# Patient Record
Sex: MALE | Race: BLACK OR AFRICAN AMERICAN
[De-identification: names, ages, dates, MRNs, and addresses within clinical notes are randomized per-mention and may not be internally consistent; named-entity substitution may affect disease eponyms.]

---

## 2020-05-21 ENCOUNTER — HOSPITAL ENCOUNTER (EMERGENCY)
Dept: HOSPITAL 92 - ERS | Age: 50
Discharge: HOME | End: 2020-05-21
Payer: SELF-PAY

## 2020-05-21 DIAGNOSIS — F17.210: ICD-10-CM

## 2020-05-21 DIAGNOSIS — I10: Primary | ICD-10-CM

## 2020-05-21 DIAGNOSIS — E11.9: ICD-10-CM

## 2020-05-21 DIAGNOSIS — Z79.84: ICD-10-CM

## 2020-05-21 DIAGNOSIS — Z79.899: ICD-10-CM

## 2020-05-21 LAB
ALBUMIN SERPL BCG-MCNC: 4.2 G/DL (ref 3.5–5)
ALP SERPL-CCNC: 87 U/L (ref 40–110)
ALT SERPL W P-5'-P-CCNC: 30 U/L (ref 8–55)
ANION GAP SERPL CALC-SCNC: 16 MMOL/L (ref 10–20)
AST SERPL-CCNC: 15 U/L (ref 5–34)
BASOPHILS # BLD AUTO: 0 THOU/UL (ref 0–0.2)
BASOPHILS NFR BLD AUTO: 0.5 % (ref 0–1)
BILIRUB SERPL-MCNC: 0.4 MG/DL (ref 0.2–1.2)
BUN SERPL-MCNC: 9 MG/DL (ref 8.9–20.6)
CALCIUM SERPL-MCNC: 9.3 MG/DL (ref 7.8–10.44)
CHLORIDE SERPL-SCNC: 101 MMOL/L (ref 98–107)
CK SERPL-CCNC: 154 U/L (ref 30–200)
CO2 SERPL-SCNC: 25 MMOL/L (ref 22–29)
CREAT CL PREDICTED SERPL C-G-VRATE: 0 ML/MIN (ref 70–130)
EOSINOPHIL # BLD AUTO: 0.1 THOU/UL (ref 0–0.7)
EOSINOPHIL NFR BLD AUTO: 1.7 % (ref 0–10)
GLOBULIN SER CALC-MCNC: 2.8 G/DL (ref 2.4–3.5)
GLUCOSE SERPL-MCNC: 173 MG/DL (ref 70–105)
HGB BLD-MCNC: 14.8 G/DL (ref 14–18)
LYMPHOCYTES # BLD: 2.2 THOU/UL (ref 1.2–3.4)
LYMPHOCYTES NFR BLD AUTO: 26.7 % (ref 21–51)
MCH RBC QN AUTO: 28 PG (ref 27–31)
MCV RBC AUTO: 88.8 FL (ref 78–98)
MONOCYTES # BLD AUTO: 0.4 THOU/UL (ref 0.11–0.59)
MONOCYTES NFR BLD AUTO: 4.6 % (ref 0–10)
NEUTROPHILS # BLD AUTO: 5.4 THOU/UL (ref 1.4–6.5)
NEUTROPHILS NFR BLD AUTO: 66.4 % (ref 42–75)
PLATELET # BLD AUTO: 163 THOU/UL (ref 130–400)
POTASSIUM SERPL-SCNC: 4 MMOL/L (ref 3.5–5.1)
RBC # BLD AUTO: 5.29 MILL/UL (ref 4.7–6.1)
SODIUM SERPL-SCNC: 138 MMOL/L (ref 136–145)
WBC # BLD AUTO: 8.1 THOU/UL (ref 4.8–10.8)

## 2020-05-21 PROCEDURE — 71045 X-RAY EXAM CHEST 1 VIEW: CPT

## 2020-05-21 PROCEDURE — 36415 COLL VENOUS BLD VENIPUNCTURE: CPT

## 2020-05-21 PROCEDURE — 85025 COMPLETE CBC W/AUTO DIFF WBC: CPT

## 2020-05-21 PROCEDURE — 93005 ELECTROCARDIOGRAM TRACING: CPT

## 2020-05-21 PROCEDURE — 80053 COMPREHEN METABOLIC PANEL: CPT

## 2020-05-21 PROCEDURE — 82550 ASSAY OF CK (CPK): CPT

## 2020-05-21 PROCEDURE — 84484 ASSAY OF TROPONIN QUANT: CPT

## 2022-01-21 NOTE — RAD
Occupational Therapy  Visit Type: treatment  Precautions:  Medical precautions:  fall risk and swallowing precautions; standard precautions.  PPE worn:  Universal mask    Patient admitted with progressive weakness and confusion.  History of a fall 1 week ago at living facility. Past medical history significant for but not limited to: HTN, memory impairment, malignant melanoma (diagnosed in August).  4 falls since Oct.  Scrotal edema     1/11/22- MRI completed: MRI done and showed subacute vs early chronic subdural hematoma and a separate area of likely chronic hematoma, consistent with fall history (4x since October)  Lines:     Basic: capped IV      Lines in chart and on patient reviewed, precautions maintained throughout session.  Vision:     Current vision: low vision  Safety Measures: bed alarm and chair alarm    SUBJECTIVE  Patient agreed to participate in therapy this date.  \"I don't want to\".  Encouragement required to engage in session  Patient / Family Goal: return home  No c/o pain    OBJECTIVE   Level of consciousness: alert    Oriented to person     Arousal alertness: delayed responses to stimuli    Affect/Behavior: irritable and cooperative  Patient activity tolerance: 1 to 2 activity to rest  Functional Communication/Cognition       Form of communication:  Verbal   Attention span:  Attends with cues to redirect and difficulty attending to directions    Commands: follows one step commands with increased time and follows one step commands with repetition.    Transition between tasks: transition with cues.    Safety judgement: decreased awareness of need for safety and decreased awareness of need for assistance.    Awareness of deficits: decreased awareness of deficits.  Additional Details: Visual impairments appear to impact safety and awareness with basic mobility.  Flucutuations in cognition.    Transfers:      Sit to stand: minimal assist    Stand to sit: minimal assist    Training completed:       PORTABLE CHEST:

 

Date:  05/21/2020

 

PROVIDED CLINICAL HISTORY:   

Blurred vision. 

 

FINDINGS:

No comparisons. 

 

Cardiac silhouette appears enlarged, which may be at least partially on the basis of portable techniq
ue. No focal consolidation, pleural fluid, or pneumothorax apparent. 

 

IMPRESSION: 

No evidence for an acute cardiopulmonary process. 

 

 

POS: HARESH Verbal cues for hand placement and steady assist for safety  Functional Ambulation:    Assistance:minimal assist   Assistive device:2-wheeled walker    Details/Comments: Basic short distance in room mobility with short shuffled steps and verbal cues/visual cue for wayfinding in room (low vision)  Activities of Daily Living (ADLs):  Lower Body Dressing:     Assist: maximal assist    Position: chair  Activities of daily living training:   Adamantly declines need for toileting or completion of self care tasks.  Increased time and encouragement for donning of incontinence briefs as patient initially refuses.  Assist required to thread loki LE.  Incidental assist to manage over hips in stance with min steady assist.  Appears to have difficulty with anterior weight shift in sitting due to appearance of scrotal swelling      Interventions    Training provided: ADL training, activity tolerance, balance retraining, compensatory techniques, functional ambulation, positioning, safety training, transfer training, breathing/relaxation, body mechanics and cognitive training  Skilled input: verbal instruction/cues and tactile instruction/cues  Verbal Consent: Writer verbally educated and received verbal consent for hand placement, positioning of patient, and techniques to be performed today from patient for clothing adjustments for techniques and therapist position for techniques as described above and how they are pertinent to the patient's plan of care.        ASSESSMENT    Impairments: activity tolerance, bed mobility, strength, safety awareness, balance, cognitive, vision, visual perceptual, decreased insight into deficits, desire/motivation, edema and skin integrity  Functional Limitations: bathing, toileting, functional transfers, dressing, showering, bed mobility, functional mobility and grooming    Patient seen on [facility] unit. Today's treatment focused on simple direction following, low body dressing and basic  functional mobility.  The patient is demonstrating fair progress as evidenced by increased ability to complete mobility and follow basic directions, however, requires increased encouragement and guiding assist in room due to low vision.      For safe return to prior living situation the patient needs to be modified independent with functional mobility and self care tasks. Patient currently lacks assistance to return to their previous living situation at current functional status with current deficits.  Concerns with low vision and impact on safety without physical assist present as well as fluctuations in cognition.     Discharge Recommendations:  OT Identified Barriers to Discharge: weakness, activity tolerance, balance, history of falls, cognition   Recommendations for Discharge: OT WI: Less intensive rehab (depends on progress)      PT/OT Mobility Equipment for Discharge: has 2ww at facility  PT/OT ADL Equipment for Discharge: pt has raised toilet seat and shower chair.    Recommended Consults: OT: None    Skilled therapy is required to address these limitations in attempt to maximize the patient's independence.  Progress: slow progress, medical status limitations    End of Session:   Location: in chair  Safety measures: call light within reach, equipment intact and alarm system in place/re-engaged  Handoff to: nurse    PLAN  Suggestions for next session as indicated: Plan: bring w/c for follow and rehab aide, progress mobility to bathroom for toileting, further visual assessment as able (functional for tasks)    OT Frequency: 3 days/week  Frequency Comments: M/W/F BONNIE, last seen 1/20  bring aide      Agreement to plan and goals: patient agrees with goals and treatment plan      GOALS  Review Date: 1/25/2022  Long Term Goals: (to be met by time of discharge from hospital)  Upper body dressing: Patient will complete upper body dressing in sitting supervision.  Status: progressing/ongoing  Lower body dressing:  Patient will complete lower body dressing in sitting supervision.  Status: progressing/ongoing  Toileting: Patient will complete toileting supervision.  Status: progressing/ongoing  Bathing: Patient will complete bathingsupervision  Status: progressing/ongoingToilet transfer: Patient will complete toilet transfer with gait belt, supervision.  Status: progressing/ongoing  Walk in shower: Patient will complete walk in shower transfer with 2-wheeled walker.   Home setting transfer: Patient will complete home setting transfers with gait belt and 2-wheeled walker, supervision.   Status: progressing/ongoing            Therapy procedure time and total treatment time can be found documented on the Time Entry flowsheet